# Patient Record
Sex: FEMALE | Race: WHITE
[De-identification: names, ages, dates, MRNs, and addresses within clinical notes are randomized per-mention and may not be internally consistent; named-entity substitution may affect disease eponyms.]

---

## 2017-02-13 ENCOUNTER — HOSPITAL ENCOUNTER (OUTPATIENT)
Dept: HOSPITAL 6 - PT | Age: 52
LOS: 73 days | Discharge: HOME | End: 2017-04-27
Payer: MEDICAID

## 2017-02-13 ENCOUNTER — HOSPITAL ENCOUNTER (EMERGENCY)
Dept: HOSPITAL 6 - ED | Age: 52
Discharge: HOME | End: 2017-02-13
Payer: MEDICAID

## 2017-02-13 DIAGNOSIS — R20.0: Primary | ICD-10-CM

## 2017-02-13 DIAGNOSIS — R53.1: ICD-10-CM

## 2017-02-13 DIAGNOSIS — Z86.73: ICD-10-CM

## 2017-02-13 DIAGNOSIS — M75.02: Primary | ICD-10-CM

## 2017-02-14 ENCOUNTER — HOSPITAL ENCOUNTER (OUTPATIENT)
Dept: HOSPITAL 19 - ZLAB.WCH | Age: 52
End: 2017-02-14

## 2017-02-14 DIAGNOSIS — Z01.89: Primary | ICD-10-CM

## 2017-04-14 ENCOUNTER — HOSPITAL ENCOUNTER (EMERGENCY)
Dept: HOSPITAL 6 - ED | Age: 52
LOS: 1 days | Discharge: HOME | End: 2017-04-15
Payer: MEDICAID
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
## 2017-04-14 DIAGNOSIS — S90.111A: Primary | ICD-10-CM

## 2017-04-15 VITALS — SYSTOLIC BLOOD PRESSURE: 112 MMHG | DIASTOLIC BLOOD PRESSURE: 74 MMHG

## 2017-04-30 ENCOUNTER — HOSPITAL ENCOUNTER (EMERGENCY)
Dept: HOSPITAL 19 - COL.ER | Age: 52
Discharge: HOME | End: 2017-04-30
Payer: MEDICAID

## 2017-04-30 VITALS — BODY MASS INDEX: 20.62 KG/M2 | WEIGHT: 128.31 LBS | HEIGHT: 65.98 IN

## 2017-04-30 VITALS — SYSTOLIC BLOOD PRESSURE: 108 MMHG | DIASTOLIC BLOOD PRESSURE: 63 MMHG | HEART RATE: 59 BPM

## 2017-04-30 VITALS — TEMPERATURE: 99.1 F

## 2017-04-30 DIAGNOSIS — N94.89: ICD-10-CM

## 2017-04-30 DIAGNOSIS — N93.9: ICD-10-CM

## 2017-04-30 DIAGNOSIS — R29.898: ICD-10-CM

## 2017-04-30 DIAGNOSIS — R10.12: Primary | ICD-10-CM

## 2017-04-30 LAB
ADJUSTED CALCIUM: 9.2 MG/DL (ref 8.4–10.2)
ALBUMIN SERPL-MCNC: 3.4 GM/DL (ref 3.5–5)
ALP SERPL-CCNC: 73 U/L (ref 50–136)
ALT SERPL-CCNC: 23 U/L (ref 9–52)
ANION GAP SERPL CALC-SCNC: 8 MMOL/L (ref 7–16)
BASOPHILS # BLD: 0.1 10*3/UL (ref 0–0.2)
BASOPHILS NFR BLD AUTO: 0.9 % (ref 0–2)
BILIRUB SERPL-MCNC: 0.5 MG/DL (ref 0–1)
BUN SERPL-MCNC: 9 MG/DL (ref 7–17)
CALCIUM SERPL-MCNC: 8.7 MG/DL (ref 8.4–10.2)
CHLAMYDIA/TRACH BY PCR FEMALE: NOT DETECTED
CHLORIDE SERPL-SCNC: 103 MMOL/L (ref 98–107)
CO2 SERPL-SCNC: 27 MMOL/L (ref 22–30)
CREAT SERPL-SCNC: 0.62 MG/DL (ref 0.52–1.25)
EOSINOPHIL # BLD: 0.4 10*3/UL (ref 0–0.7)
EOSINOPHIL NFR BLD: 3.8 % (ref 0–4)
ERYTHROCYTE [DISTWIDTH] IN BLOOD BY AUTOMATED COUNT: 12.1 % (ref 11.5–14.5)
GLUCOSE SERPL-MCNC: 83 MG/DL (ref 74–106)
GRANULOCYTES # BLD AUTO: 57.7 % (ref 42.2–75.2)
HCT VFR BLD AUTO: 34.5 % (ref 37–47)
HGB BLD-MCNC: 11.4 G/DL (ref 12.5–16)
LIPASE SERPL-CCNC: 41 U/L (ref 23–300)
LYMPHOCYTES # BLD: 3.7 10*3/UL (ref 1.2–3.4)
LYMPHOCYTES NFR BLD: 32 % (ref 20–51)
MCH RBC QN AUTO: 33 PG (ref 27–31)
MCHC RBC AUTO-ENTMCNC: 33 G/DL (ref 33–37)
MCV RBC AUTO: 99 FL (ref 80–100)
MONOCYTES # BLD: 0.6 10*3/UL (ref 0.1–0.6)
MONOCYTES NFR BLD AUTO: 5.3 % (ref 1.7–9.3)
NEISSERIA GON BY PCR FEMALE: NOT DETECTED
NEUTROPHILS # BLD: 6.7 10*3/UL (ref 1.4–6.5)
PH UR STRIP.AUTO: 6 [PH] (ref 5–8)
PLATELET # BLD AUTO: 212 K/MM3 (ref 130–400)
PMV BLD AUTO: 10.2 FL (ref 7.4–10.4)
POTASSIUM SERPL-SCNC: 3.8 MMOL/L (ref 3.4–5)
PROT SERPL-MCNC: 6 GM/DL (ref 6.4–8.2)
RBC # BLD AUTO: 3.48 M/MM3 (ref 4.1–5.3)
RBC # UR: (no result) /HPF
SODIUM SERPL-SCNC: 137 MMOL/L (ref 137–145)
SP GR UR STRIP.AUTO: 1 (ref 1–1.03)
SQUAMOUS # URNS: (no result) /HPF
WBC # BLD AUTO: 11.6 K/MM3 (ref 4.8–10.8)
WBC #/AREA URNS HPF: (no result) /HPF

## 2017-05-16 ENCOUNTER — HOSPITAL ENCOUNTER (EMERGENCY)
Dept: HOSPITAL 19 - COL.ER | Age: 52
Discharge: HOME | End: 2017-05-16
Payer: MEDICAID

## 2017-05-16 VITALS — TEMPERATURE: 98.9 F

## 2017-05-16 VITALS — HEIGHT: 65.98 IN | WEIGHT: 131.18 LBS | BODY MASS INDEX: 21.08 KG/M2

## 2017-05-16 VITALS — SYSTOLIC BLOOD PRESSURE: 110 MMHG | DIASTOLIC BLOOD PRESSURE: 73 MMHG | HEART RATE: 56 BPM

## 2017-05-16 DIAGNOSIS — R10.31: Primary | ICD-10-CM

## 2017-05-16 DIAGNOSIS — J44.9: ICD-10-CM

## 2017-05-16 DIAGNOSIS — I35.1: ICD-10-CM

## 2017-05-16 DIAGNOSIS — Z99.81: ICD-10-CM

## 2017-05-16 DIAGNOSIS — F17.210: ICD-10-CM

## 2017-05-16 DIAGNOSIS — F43.10: ICD-10-CM

## 2017-05-16 LAB
ADJUSTED CALCIUM: 9.1 MG/DL (ref 8.4–10.2)
ALBUMIN SERPL-MCNC: 4 GM/DL (ref 3.5–5)
ALP SERPL-CCNC: 70 U/L (ref 50–136)
ALT SERPL-CCNC: 18 U/L (ref 9–52)
ANION GAP SERPL CALC-SCNC: 10 MMOL/L (ref 7–16)
BASOPHILS # BLD: 0.1 10*3/UL (ref 0–0.2)
BASOPHILS NFR BLD AUTO: 1.1 % (ref 0–2)
BILIRUB SERPL-MCNC: 0.6 MG/DL (ref 0–1)
BUN SERPL-MCNC: 11 MG/DL (ref 7–17)
CALCIUM SERPL-MCNC: 9.1 MG/DL (ref 8.4–10.2)
CHLAMYDIA/TRACH BY PCR FEMALE: NOT DETECTED
CHLORIDE SERPL-SCNC: 101 MMOL/L (ref 98–107)
CO2 SERPL-SCNC: 27 MMOL/L (ref 22–30)
CREAT SERPL-SCNC: 0.67 MG/DL (ref 0.52–1.25)
EOSINOPHIL # BLD: 0.4 10*3/UL (ref 0–0.7)
EOSINOPHIL NFR BLD: 4.2 % (ref 0–4)
ERYTHROCYTE [DISTWIDTH] IN BLOOD BY AUTOMATED COUNT: 11.8 % (ref 11.5–14.5)
GLUCOSE SERPL-MCNC: 76 MG/DL (ref 74–106)
GRANULOCYTES # BLD AUTO: 51.9 % (ref 42.2–75.2)
HCT VFR BLD AUTO: 38.2 % (ref 37–47)
HGB BLD-MCNC: 12.8 G/DL (ref 12.5–16)
LYMPHOCYTES # BLD: 3.3 10*3/UL (ref 1.2–3.4)
LYMPHOCYTES NFR BLD: 35.4 % (ref 20–51)
MCH RBC QN AUTO: 33 PG (ref 27–31)
MCHC RBC AUTO-ENTMCNC: 34 G/DL (ref 33–37)
MCV RBC AUTO: 98 FL (ref 80–100)
MONOCYTES # BLD: 0.7 10*3/UL (ref 0.1–0.6)
MONOCYTES NFR BLD AUTO: 7.1 % (ref 1.7–9.3)
NEISSERIA GON BY PCR FEMALE: NOT DETECTED
NEUTROPHILS # BLD: 4.8 10*3/UL (ref 1.4–6.5)
PH UR STRIP.AUTO: 6 [PH] (ref 5–8)
PLATELET # BLD AUTO: 181 K/MM3 (ref 130–400)
PMV BLD AUTO: 10.7 FL (ref 7.4–10.4)
POTASSIUM SERPL-SCNC: 4.2 MMOL/L (ref 3.4–5)
PROT SERPL-MCNC: 6.7 GM/DL (ref 6.4–8.2)
RBC # BLD AUTO: 3.91 M/MM3 (ref 4.1–5.3)
RBC # UR: (no result) /HPF
SODIUM SERPL-SCNC: 139 MMOL/L (ref 137–145)
SP GR UR STRIP.AUTO: 1.02 (ref 1–1.03)
SQUAMOUS # URNS: (no result) /HPF
WBC # BLD AUTO: 9.2 K/MM3 (ref 4.8–10.8)
WBC # UR: (no result) /HPF

## 2017-05-17 ENCOUNTER — HOSPITAL ENCOUNTER (EMERGENCY)
Dept: HOSPITAL 19 - COL.ER | Age: 52
Discharge: HOME | End: 2017-05-17
Payer: MEDICAID

## 2017-05-17 VITALS — SYSTOLIC BLOOD PRESSURE: 110 MMHG | HEART RATE: 51 BPM | DIASTOLIC BLOOD PRESSURE: 82 MMHG

## 2017-05-17 VITALS — BODY MASS INDEX: 20.44 KG/M2 | TEMPERATURE: 98.8 F | HEIGHT: 66 IN | WEIGHT: 127.21 LBS

## 2017-05-17 DIAGNOSIS — F17.210: ICD-10-CM

## 2017-05-17 DIAGNOSIS — I95.9: ICD-10-CM

## 2017-05-17 DIAGNOSIS — F43.10: ICD-10-CM

## 2017-05-17 DIAGNOSIS — J44.9: ICD-10-CM

## 2017-05-17 DIAGNOSIS — R11.2: ICD-10-CM

## 2017-05-17 DIAGNOSIS — R10.32: ICD-10-CM

## 2017-05-17 DIAGNOSIS — R10.31: Primary | ICD-10-CM

## 2017-05-17 LAB
ADJUSTED CALCIUM: 9.3 MG/DL (ref 8.4–10.2)
ALBUMIN SERPL-MCNC: 4.1 GM/DL (ref 3.5–5)
ALP SERPL-CCNC: 76 U/L (ref 50–136)
ALT SERPL-CCNC: 23 U/L (ref 9–52)
ANION GAP SERPL CALC-SCNC: 8 MMOL/L (ref 7–16)
BASOPHILS # BLD: 0.1 10*3/UL (ref 0–0.2)
BASOPHILS NFR BLD AUTO: 1.2 % (ref 0–2)
BILIRUB SERPL-MCNC: 0.6 MG/DL (ref 0–1)
BUN SERPL-MCNC: 11 MG/DL (ref 7–17)
CALCIUM SERPL-MCNC: 9.4 MG/DL (ref 8.4–10.2)
CHLORIDE SERPL-SCNC: 99 MMOL/L (ref 98–107)
CO2 SERPL-SCNC: 30 MMOL/L (ref 22–30)
CREAT SERPL-SCNC: 0.67 MG/DL (ref 0.52–1.25)
CRP SERPL-MCNC: < 0.5 MG/DL (ref 0–0.9)
EOSINOPHIL # BLD: 0.4 10*3/UL (ref 0–0.7)
EOSINOPHIL NFR BLD: 3.4 % (ref 0–4)
ERYTHROCYTE [DISTWIDTH] IN BLOOD BY AUTOMATED COUNT: 11.5 % (ref 11.5–14.5)
GLUCOSE SERPL-MCNC: 82 MG/DL (ref 74–106)
GRANULOCYTES # BLD AUTO: 57.4 % (ref 42.2–75.2)
HCT VFR BLD AUTO: 38.7 % (ref 37–47)
HGB BLD-MCNC: 12.9 G/DL (ref 12.5–16)
LYMPHOCYTES # BLD: 3.2 10*3/UL (ref 1.2–3.4)
LYMPHOCYTES NFR BLD: 31.3 % (ref 20–51)
MCH RBC QN AUTO: 33 PG (ref 27–31)
MCHC RBC AUTO-ENTMCNC: 33 G/DL (ref 33–37)
MCV RBC AUTO: 98 FL (ref 80–100)
MONOCYTES # BLD: 0.7 10*3/UL (ref 0.1–0.6)
MONOCYTES NFR BLD AUTO: 6.4 % (ref 1.7–9.3)
NEUTROPHILS # BLD: 6 10*3/UL (ref 1.4–6.5)
PLATELET # BLD AUTO: 185 K/MM3 (ref 130–400)
PMV BLD AUTO: 11.3 FL (ref 7.4–10.4)
POTASSIUM SERPL-SCNC: 4.1 MMOL/L (ref 3.4–5)
PROT SERPL-MCNC: 6.7 GM/DL (ref 6.4–8.2)
RBC # BLD AUTO: 3.94 M/MM3 (ref 4.1–5.3)
SODIUM SERPL-SCNC: 138 MMOL/L (ref 137–145)
WBC # BLD AUTO: 10.4 K/MM3 (ref 4.8–10.8)

## 2017-06-14 ENCOUNTER — HOSPITAL ENCOUNTER (EMERGENCY)
Dept: HOSPITAL 19 - COL.ER | Age: 52
Discharge: HOME | End: 2017-06-14
Payer: MEDICAID

## 2017-06-14 VITALS — WEIGHT: 128.31 LBS | HEIGHT: 65.98 IN | BODY MASS INDEX: 20.62 KG/M2

## 2017-06-14 VITALS — SYSTOLIC BLOOD PRESSURE: 105 MMHG | DIASTOLIC BLOOD PRESSURE: 65 MMHG | HEART RATE: 63 BPM

## 2017-06-14 VITALS — TEMPERATURE: 98.7 F

## 2017-06-14 DIAGNOSIS — I10: ICD-10-CM

## 2017-06-14 DIAGNOSIS — G40.909: ICD-10-CM

## 2017-06-14 DIAGNOSIS — R55: ICD-10-CM

## 2017-06-14 DIAGNOSIS — R51: Primary | ICD-10-CM

## 2017-06-14 DIAGNOSIS — R07.9: ICD-10-CM

## 2017-06-14 DIAGNOSIS — F17.210: ICD-10-CM

## 2017-06-14 LAB
ADJUSTED CALCIUM: 9 MG/DL (ref 8.4–10.2)
ALBUMIN SERPL-MCNC: 4.4 GM/DL (ref 3.5–5)
ALP SERPL-CCNC: 71 U/L (ref 50–136)
ALT SERPL-CCNC: 15 U/L (ref 9–52)
ANION GAP SERPL CALC-SCNC: 11 MMOL/L (ref 7–16)
BASOPHILS # BLD: 0.1 10*3/UL (ref 0–0.2)
BASOPHILS NFR BLD AUTO: 1.1 % (ref 0–2)
BILIRUB SERPL-MCNC: 0.7 MG/DL (ref 0–1)
BUN SERPL-MCNC: 6 MG/DL (ref 7–17)
CALCIUM SERPL-MCNC: 9.3 MG/DL (ref 8.4–10.2)
CHLORIDE SERPL-SCNC: 100 MMOL/L (ref 98–107)
CO2 SERPL-SCNC: 27 MMOL/L (ref 22–30)
CREAT SERPL-SCNC: 0.61 MG/DL (ref 0.52–1.25)
CRP SERPL-MCNC: 0.5 MG/DL (ref 0–0.9)
DIGOXIN SERPL-MCNC: 20.9 NG/ML (ref 3–18.6)
EOSINOPHIL # BLD: 0.5 10*3/UL (ref 0–0.7)
EOSINOPHIL NFR BLD: 4.7 % (ref 0–4)
ERYTHROCYTE [DISTWIDTH] IN BLOOD BY AUTOMATED COUNT: 11.6 % (ref 11.5–14.5)
ERYTHROCYTE [SEDIMENTATION RATE] IN BLOOD: 7 MM/HR (ref 0–20)
GLUCOSE SERPL-MCNC: 77 MG/DL (ref 74–106)
GRANULOCYTES # BLD AUTO: 50.7 % (ref 42.2–75.2)
HCT VFR BLD AUTO: 39.4 % (ref 37–47)
HGB BLD-MCNC: 13.4 G/DL (ref 12.5–16)
LYMPHOCYTES # BLD: 3.7 10*3/UL (ref 1.2–3.4)
LYMPHOCYTES NFR BLD: 35.9 % (ref 20–51)
MCH RBC QN AUTO: 32 PG (ref 27–31)
MCHC RBC AUTO-ENTMCNC: 34 G/DL (ref 33–37)
MCV RBC AUTO: 95 FL (ref 80–100)
MONOCYTES # BLD: 0.7 10*3/UL (ref 0.1–0.6)
MONOCYTES NFR BLD AUTO: 7.2 % (ref 1.7–9.3)
NEUTROPHILS # BLD: 5.2 10*3/UL (ref 1.4–6.5)
PLATELET # BLD AUTO: 221 K/MM3 (ref 130–400)
PMV BLD AUTO: 9.8 FL (ref 7.4–10.4)
POTASSIUM SERPL-SCNC: 3.9 MMOL/L (ref 3.4–5)
PROT SERPL-MCNC: 7.1 GM/DL (ref 6.4–8.2)
RBC # BLD AUTO: 4.13 M/MM3 (ref 4.1–5.3)
SODIUM SERPL-SCNC: 138 MMOL/L (ref 137–145)
TROPONIN I SERPL-MCNC: < 0.012 NG/ML (ref 0–0.03)
WBC # BLD AUTO: 10.3 K/MM3 (ref 4.8–10.8)

## 2017-07-27 ENCOUNTER — HOSPITAL ENCOUNTER (EMERGENCY)
Dept: HOSPITAL 19 - COL.ER | Age: 52
Discharge: HOME | End: 2017-07-27
Payer: MEDICAID

## 2017-07-27 VITALS — SYSTOLIC BLOOD PRESSURE: 101 MMHG | TEMPERATURE: 99.1 F | DIASTOLIC BLOOD PRESSURE: 55 MMHG

## 2017-07-27 VITALS — HEIGHT: 65.98 IN | WEIGHT: 128.31 LBS | BODY MASS INDEX: 20.62 KG/M2

## 2017-07-27 VITALS — HEART RATE: 82 BPM
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
## 2017-07-27 DIAGNOSIS — S06.0X9A: Primary | ICD-10-CM

## 2017-07-27 DIAGNOSIS — R56.9: ICD-10-CM

## 2017-07-27 DIAGNOSIS — M54.2: ICD-10-CM
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
## 2017-07-27 DIAGNOSIS — S00.93XA: ICD-10-CM

## 2017-07-27 DIAGNOSIS — Z91.81: ICD-10-CM

## 2017-07-27 DIAGNOSIS — M54.89: ICD-10-CM

## 2018-01-10 ENCOUNTER — HOSPITAL ENCOUNTER (OUTPATIENT)
Dept: HOSPITAL 19 - COL.RAD | Age: 53
End: 2018-01-10
Attending: PSYCHIATRY & NEUROLOGY
Payer: MEDICAID

## 2018-01-10 DIAGNOSIS — R42: Primary | ICD-10-CM

## 2018-01-10 DIAGNOSIS — R51: ICD-10-CM

## 2018-01-10 DIAGNOSIS — R26.81: ICD-10-CM

## 2018-04-21 ENCOUNTER — HOSPITAL ENCOUNTER (EMERGENCY)
Dept: HOSPITAL 19 - COL.ER | Age: 53
Discharge: HOME | End: 2018-04-21
Payer: MEDICAID

## 2018-04-21 VITALS — HEIGHT: 72 IN | BODY MASS INDEX: 21.86 KG/M2 | WEIGHT: 161.38 LBS

## 2018-04-21 VITALS — SYSTOLIC BLOOD PRESSURE: 112 MMHG | TEMPERATURE: 99 F | DIASTOLIC BLOOD PRESSURE: 63 MMHG

## 2018-04-21 VITALS — HEART RATE: 79 BPM

## 2018-04-21 DIAGNOSIS — R19.7: Primary | ICD-10-CM

## 2018-04-21 DIAGNOSIS — R10.9: ICD-10-CM

## 2018-04-21 DIAGNOSIS — Z90.49: ICD-10-CM

## 2018-04-21 DIAGNOSIS — G43.909: ICD-10-CM

## 2018-04-21 LAB
ALBUMIN SERPL-MCNC: 3.6 GM/DL (ref 3.5–5)
ALP SERPL-CCNC: 113 U/L (ref 50–136)
ALT SERPL-CCNC: 18 U/L (ref 9–52)
ANION GAP SERPL CALC-SCNC: 14 MMOL/L (ref 7–16)
AST SERPL-CCNC: 15 U/L (ref 15–37)
BASOPHILS # BLD: 0.1 10*3/UL (ref 0–0.2)
BASOPHILS NFR BLD AUTO: 0.4 % (ref 0–2)
BILIRUB SERPL-MCNC: 0.3 MG/DL (ref 0–1)
BUN SERPL-MCNC: 11 MG/DL (ref 7–17)
CALCIUM SERPL-MCNC: 8.6 MG/DL (ref 8.4–10.2)
CHLORIDE SERPL-SCNC: 102 MMOL/L (ref 98–107)
CO2 SERPL-SCNC: 20 MMOL/L (ref 22–30)
CREAT SERPL-SCNC: 0.85 MG/DL (ref 0.52–1.25)
CRP SERPL-MCNC: 3.4 MG/DL (ref 0–0.9)
EOSINOPHIL # BLD: 0.4 10*3/UL (ref 0–0.7)
EOSINOPHIL NFR BLD: 3.4 % (ref 0–4)
ERYTHROCYTE [DISTWIDTH] IN BLOOD BY AUTOMATED COUNT: 12.7 % (ref 11.5–14.5)
GLUCOSE SERPL-MCNC: 95 MG/DL (ref 74–106)
GRANULOCYTES # BLD AUTO: 72 % (ref 42.2–75.2)
HCT VFR BLD AUTO: 34.9 % (ref 37–47)
HGB BLD-MCNC: 11.8 G/DL (ref 12.5–16)
LYMPHOCYTES # BLD: 2.2 10*3/UL (ref 1.2–3.4)
LYMPHOCYTES NFR BLD: 17.8 % (ref 20–51)
MCH RBC QN AUTO: 31 PG (ref 27–31)
MCHC RBC AUTO-ENTMCNC: 34 G/DL (ref 33–37)
MCV RBC AUTO: 90 FL (ref 80–100)
MONOCYTES # BLD: 0.6 10*3/UL (ref 0.1–0.6)
MONOCYTES NFR BLD AUTO: 5.2 % (ref 1.7–9.3)
NEUTROPHILS # BLD: 8.8 10*3/UL (ref 1.4–6.5)
PH UR STRIP.AUTO: 5 [PH] (ref 5–8)
PLATELET # BLD AUTO: 279 K/MM3 (ref 130–400)
PMV BLD AUTO: 9.9 FL (ref 7.4–10.4)
POTASSIUM SERPL-SCNC: 3.5 MMOL/L (ref 3.4–5)
PROT SERPL-MCNC: 7 GM/DL (ref 6.4–8.2)
RBC # BLD AUTO: 3.86 M/MM3 (ref 4.1–5.3)
RBC # UR: (no result) /HPF
SODIUM SERPL-SCNC: 135 MMOL/L (ref 137–145)
SP GR UR STRIP.AUTO: 1.02 (ref 1–1.03)
SQUAMOUS # URNS: (no result) /HPF
URN COLLECT METHOD CLASS: (no result)

## 2018-04-26 ENCOUNTER — HOSPITAL ENCOUNTER (OUTPATIENT)
Dept: HOSPITAL 19 - COL.RAD | Age: 53
End: 2018-04-26
Payer: MEDICAID

## 2018-04-26 DIAGNOSIS — R14.0: ICD-10-CM

## 2018-04-26 DIAGNOSIS — Z88.0: ICD-10-CM

## 2018-04-26 DIAGNOSIS — K59.00: Primary | ICD-10-CM

## 2018-04-26 DIAGNOSIS — R63.5: ICD-10-CM

## 2018-04-26 DIAGNOSIS — Z88.1: ICD-10-CM

## 2018-04-26 DIAGNOSIS — R14.2: ICD-10-CM

## 2018-04-26 DIAGNOSIS — R10.9: ICD-10-CM

## 2018-04-26 DIAGNOSIS — R19.7: ICD-10-CM

## 2018-04-26 DIAGNOSIS — R63.0: ICD-10-CM

## 2018-06-08 ENCOUNTER — HOSPITAL ENCOUNTER (EMERGENCY)
Dept: HOSPITAL 6 - ED | Age: 53
LOS: 1 days | Discharge: HOME | End: 2018-06-09
Payer: MEDICAID

## 2018-06-08 VITALS — HEIGHT: 63 IN | BODY MASS INDEX: 31.95 KG/M2 | WEIGHT: 180.34 LBS

## 2018-06-08 DIAGNOSIS — F17.200: ICD-10-CM

## 2018-06-08 DIAGNOSIS — T50.905A: ICD-10-CM

## 2018-06-08 DIAGNOSIS — R22.0: Primary | ICD-10-CM

## 2018-06-08 DIAGNOSIS — J44.9: ICD-10-CM

## 2018-06-08 DIAGNOSIS — B37.0: ICD-10-CM

## 2018-06-09 VITALS
DIASTOLIC BLOOD PRESSURE: 74 MMHG | SYSTOLIC BLOOD PRESSURE: 105 MMHG | SYSTOLIC BLOOD PRESSURE: 105 MMHG | SYSTOLIC BLOOD PRESSURE: 105 MMHG | DIASTOLIC BLOOD PRESSURE: 74 MMHG | DIASTOLIC BLOOD PRESSURE: 74 MMHG | SYSTOLIC BLOOD PRESSURE: 105 MMHG | DIASTOLIC BLOOD PRESSURE: 74 MMHG

## 2018-07-06 ENCOUNTER — HOSPITAL ENCOUNTER (INPATIENT)
Dept: HOSPITAL 19 - COL.ER | Age: 53
LOS: 3 days | Discharge: HOME | DRG: 313 | End: 2018-07-09
Attending: HEALTH CARE PROVIDER | Admitting: HEALTH CARE PROVIDER
Payer: MEDICAID

## 2018-07-06 VITALS — BODY MASS INDEX: 28.8 KG/M2 | WEIGHT: 172.84 LBS | HEIGHT: 65 IN

## 2018-07-06 DIAGNOSIS — R07.89: Primary | ICD-10-CM

## 2018-07-06 DIAGNOSIS — M79.7: ICD-10-CM

## 2018-07-06 DIAGNOSIS — G62.9: ICD-10-CM

## 2018-07-06 DIAGNOSIS — G40.909: ICD-10-CM

## 2018-07-06 DIAGNOSIS — Z87.891: ICD-10-CM

## 2018-07-06 DIAGNOSIS — I08.0: ICD-10-CM

## 2018-07-06 DIAGNOSIS — J44.9: ICD-10-CM

## 2018-07-06 DIAGNOSIS — I50.22: ICD-10-CM

## 2018-07-06 DIAGNOSIS — I25.10: ICD-10-CM

## 2018-07-06 LAB
ALBUMIN SERPL-MCNC: 3.8 GM/DL (ref 3.5–5)
ALP SERPL-CCNC: 139 U/L (ref 50–136)
ALT SERPL-CCNC: 63 U/L (ref 9–52)
ANION GAP SERPL CALC-SCNC: 11 MMOL/L (ref 7–16)
AST SERPL-CCNC: 28 U/L (ref 15–37)
BASOPHILS # BLD: 0.1 10*3/UL (ref 0–0.2)
BASOPHILS NFR BLD AUTO: 0.7 % (ref 0–2)
BILIRUB SERPL-MCNC: 0.1 MG/DL (ref 0–1)
BUN SERPL-MCNC: 12 MG/DL (ref 7–17)
CALCIUM SERPL-MCNC: 9 MG/DL (ref 8.4–10.2)
CHLORIDE SERPL-SCNC: 100 MMOL/L (ref 98–107)
CK SERPL-CCNC: 29 U/L (ref 30–135)
CO2 SERPL-SCNC: 26 MMOL/L (ref 22–30)
CREAT SERPL-SCNC: 0.64 MG/DL (ref 0.52–1.25)
DEPRECATED D DIMER PPP IA-ACNC: < 200 NG/MLDDU (ref 200–230)
EOSINOPHIL # BLD: 0.2 10*3/UL (ref 0–0.7)
EOSINOPHIL NFR BLD: 1.7 % (ref 0–4)
ERYTHROCYTE [DISTWIDTH] IN BLOOD BY AUTOMATED COUNT: 13.5 % (ref 11.5–14.5)
GLUCOSE SERPL-MCNC: 98 MG/DL (ref 74–106)
GRANULOCYTES # BLD AUTO: 63.7 % (ref 42.2–75.2)
HCT VFR BLD AUTO: 35.3 % (ref 37–47)
HGB BLD-MCNC: 11.2 G/DL (ref 12.5–16)
INR BLD: 0.9 (ref 0.8–3)
LIPASE SERPL-CCNC: 52 U/L (ref 23–300)
LYMPHOCYTES # BLD: 3.2 10*3/UL (ref 1.2–3.4)
LYMPHOCYTES NFR BLD: 23.7 % (ref 20–51)
MCH RBC QN AUTO: 30 PG (ref 27–31)
MCHC RBC AUTO-ENTMCNC: 32 G/DL (ref 33–37)
MCV RBC AUTO: 95 FL (ref 80–100)
MONOCYTES # BLD: 1 10*3/UL (ref 0.1–0.6)
MONOCYTES NFR BLD AUTO: 7.6 % (ref 1.7–9.3)
NEUTROPHILS # BLD: 8.5 10*3/UL (ref 1.4–6.5)
PLATELET # BLD AUTO: 320 K/MM3 (ref 130–400)
PMV BLD AUTO: 9.8 FL (ref 7.4–10.4)
POTASSIUM SERPL-SCNC: 4 MMOL/L (ref 3.4–5)
PROT SERPL-MCNC: 6.7 GM/DL (ref 6.4–8.2)
PROTHROMBIN TIME: 9.7 SECONDS (ref 9.7–12.8)
RBC # BLD AUTO: 3.7 M/MM3 (ref 4.1–5.3)
SODIUM SERPL-SCNC: 137 MMOL/L (ref 137–145)
TROPONIN I SERPL-MCNC: < 0.012 NG/ML (ref 0–0.03)

## 2018-07-06 PROCEDURE — A9502 TC99M TETROFOSMIN: HCPCS

## 2018-07-07 VITALS — OXYGEN SATURATION: 98 %

## 2018-07-07 VITALS — DIASTOLIC BLOOD PRESSURE: 72 MMHG | HEART RATE: 79 BPM | SYSTOLIC BLOOD PRESSURE: 118 MMHG | TEMPERATURE: 97.8 F

## 2018-07-07 VITALS — OXYGEN SATURATION: 97 %

## 2018-07-07 VITALS — OXYGEN SATURATION: 96 %

## 2018-07-07 VITALS — OXYGEN SATURATION: 99 %

## 2018-07-07 VITALS — OXYGEN SATURATION: 95 %

## 2018-07-07 VITALS — TEMPERATURE: 97.1 F | DIASTOLIC BLOOD PRESSURE: 69 MMHG | SYSTOLIC BLOOD PRESSURE: 96 MMHG | HEART RATE: 85 BPM

## 2018-07-07 VITALS — OXYGEN SATURATION: 90 %

## 2018-07-07 VITALS
DIASTOLIC BLOOD PRESSURE: 72 MMHG | SYSTOLIC BLOOD PRESSURE: 112 MMHG | OXYGEN SATURATION: 98 % | TEMPERATURE: 97.1 F | HEART RATE: 84 BPM

## 2018-07-07 VITALS — DIASTOLIC BLOOD PRESSURE: 67 MMHG | SYSTOLIC BLOOD PRESSURE: 93 MMHG | TEMPERATURE: 97 F | HEART RATE: 80 BPM

## 2018-07-07 VITALS — OXYGEN SATURATION: 93 %

## 2018-07-07 VITALS — OXYGEN SATURATION: 100 %

## 2018-07-07 VITALS — HEART RATE: 89 BPM | SYSTOLIC BLOOD PRESSURE: 107 MMHG | DIASTOLIC BLOOD PRESSURE: 69 MMHG | TEMPERATURE: 98.3 F

## 2018-07-07 VITALS — OXYGEN SATURATION: 92 %

## 2018-07-07 VITALS — DIASTOLIC BLOOD PRESSURE: 71 MMHG | HEART RATE: 82 BPM | SYSTOLIC BLOOD PRESSURE: 111 MMHG

## 2018-07-07 VITALS — OXYGEN SATURATION: 94 %

## 2018-07-07 LAB
ALBUMIN SERPL-MCNC: 3.1 GM/DL (ref 3.5–5)
ALP SERPL-CCNC: 105 U/L (ref 50–136)
ALT SERPL-CCNC: 55 U/L (ref 9–52)
ANION GAP SERPL CALC-SCNC: 7 MMOL/L (ref 7–16)
AST SERPL-CCNC: 26 U/L (ref 15–37)
BILIRUB SERPL-MCNC: 0.2 MG/DL (ref 0–1)
BUN SERPL-MCNC: 11 MG/DL (ref 7–17)
CALCIUM SERPL-MCNC: 8.3 MG/DL (ref 8.4–10.2)
CHLORIDE SERPL-SCNC: 101 MMOL/L (ref 98–107)
CHOLEST SPEC-SCNC: 226 MG/DL (ref 120–200)
CHOLEST/HDLC SERPL-SRTO: 5.3
CO2 SERPL-SCNC: 29 MMOL/L (ref 22–30)
CREAT SERPL-SCNC: 0.65 MG/DL (ref 0.52–1.25)
EOSINOPHIL NFR BLD: 2 % (ref 0–4)
ERYTHROCYTE [DISTWIDTH] IN BLOOD BY AUTOMATED COUNT: 13.5 % (ref 11.5–14.5)
GLUCOSE SERPL-MCNC: 93 MG/DL (ref 74–106)
HCT VFR BLD AUTO: 31 % (ref 37–47)
HDLC SERPL-MCNC: 42 MG/DL
HGB BLD-MCNC: 9.7 G/DL (ref 12.5–16)
LDLC SERPL-MCNC: 140 MG/DL
LYMPHOCYTES NFR BLD MANUAL: 34 % (ref 20–51)
MCH RBC QN AUTO: 30 PG (ref 27–31)
MCHC RBC AUTO-ENTMCNC: 31 G/DL (ref 33–37)
MCV RBC AUTO: 95 FL (ref 80–100)
MONOCYTES NFR BLD: 4 % (ref 1.7–9.3)
NEUTS BAND NFR BLD: 5 % (ref 0–10)
NEUTS SEG NFR BLD MANUAL: 55 % (ref 42–75.2)
PH UR STRIP.AUTO: 6 [PH] (ref 5–8)
PLATELET # BLD AUTO: 267 K/MM3 (ref 130–400)
PLATELET BLD QL SMEAR: NORMAL
PMV BLD AUTO: 9.6 FL (ref 7.4–10.4)
POTASSIUM SERPL-SCNC: 4.2 MMOL/L (ref 3.4–5)
PROT SERPL-MCNC: 5.7 GM/DL (ref 6.4–8.2)
RBC # BLD AUTO: 3.25 M/MM3 (ref 4.1–5.3)
RBC # UR STRIP.AUTO: (no result) /UL
RBC # UR: (no result) /HPF
SODIUM SERPL-SCNC: 137 MMOL/L (ref 137–145)
SP GR UR STRIP.AUTO: 1.01 (ref 1–1.03)
SQUAMOUS # URNS: (no result) /HPF
TRIGL SERPL-MCNC: 222 MG/DL
TROPONIN I SERPL-MCNC: < 0.012 NG/ML (ref 0–0.03)
URN COLLECT METHOD CLASS: (no result)

## 2018-07-08 VITALS — HEART RATE: 85 BPM | DIASTOLIC BLOOD PRESSURE: 69 MMHG | TEMPERATURE: 97.4 F | SYSTOLIC BLOOD PRESSURE: 121 MMHG

## 2018-07-08 VITALS — DIASTOLIC BLOOD PRESSURE: 73 MMHG | SYSTOLIC BLOOD PRESSURE: 114 MMHG | TEMPERATURE: 98.4 F | HEART RATE: 77 BPM

## 2018-07-08 VITALS — SYSTOLIC BLOOD PRESSURE: 113 MMHG | DIASTOLIC BLOOD PRESSURE: 69 MMHG | HEART RATE: 80 BPM | TEMPERATURE: 98 F

## 2018-07-08 VITALS — DIASTOLIC BLOOD PRESSURE: 57 MMHG | SYSTOLIC BLOOD PRESSURE: 99 MMHG | TEMPERATURE: 98.1 F | HEART RATE: 77 BPM

## 2018-07-08 VITALS — SYSTOLIC BLOOD PRESSURE: 104 MMHG | TEMPERATURE: 98.5 F | HEART RATE: 79 BPM | DIASTOLIC BLOOD PRESSURE: 67 MMHG

## 2018-07-08 VITALS — HEART RATE: 75 BPM | DIASTOLIC BLOOD PRESSURE: 67 MMHG | SYSTOLIC BLOOD PRESSURE: 101 MMHG | TEMPERATURE: 98.2 F

## 2018-07-09 VITALS — HEART RATE: 94 BPM | DIASTOLIC BLOOD PRESSURE: 57 MMHG | SYSTOLIC BLOOD PRESSURE: 94 MMHG

## 2018-07-09 VITALS — TEMPERATURE: 98.6 F | SYSTOLIC BLOOD PRESSURE: 105 MMHG | HEART RATE: 106 BPM | DIASTOLIC BLOOD PRESSURE: 68 MMHG

## 2018-07-09 VITALS — HEART RATE: 109 BPM | SYSTOLIC BLOOD PRESSURE: 115 MMHG | DIASTOLIC BLOOD PRESSURE: 62 MMHG

## 2018-07-09 VITALS — TEMPERATURE: 98.4 F | SYSTOLIC BLOOD PRESSURE: 94 MMHG | HEART RATE: 94 BPM | DIASTOLIC BLOOD PRESSURE: 57 MMHG

## 2018-07-09 VITALS — DIASTOLIC BLOOD PRESSURE: 68 MMHG | HEART RATE: 92 BPM | SYSTOLIC BLOOD PRESSURE: 111 MMHG | TEMPERATURE: 98.4 F

## 2018-07-09 VITALS — DIASTOLIC BLOOD PRESSURE: 66 MMHG | SYSTOLIC BLOOD PRESSURE: 113 MMHG | HEART RATE: 83 BPM

## 2018-07-09 VITALS — SYSTOLIC BLOOD PRESSURE: 96 MMHG | DIASTOLIC BLOOD PRESSURE: 51 MMHG | HEART RATE: 87 BPM | TEMPERATURE: 98.5 F

## 2018-07-09 VITALS — HEART RATE: 113 BPM | SYSTOLIC BLOOD PRESSURE: 118 MMHG | DIASTOLIC BLOOD PRESSURE: 68 MMHG

## 2018-07-09 VITALS — HEART RATE: 111 BPM | SYSTOLIC BLOOD PRESSURE: 116 MMHG | DIASTOLIC BLOOD PRESSURE: 66 MMHG

## 2018-07-09 LAB
ANION GAP SERPL CALC-SCNC: 12 MMOL/L (ref 7–16)
BASOPHILS NFR BLD MANUAL: 2 % (ref 0–2)
BUN SERPL-MCNC: 17 MG/DL (ref 7–17)
CALCIUM SERPL-MCNC: 9.4 MG/DL (ref 8.4–10.2)
CHLORIDE SERPL-SCNC: 99 MMOL/L (ref 98–107)
CO2 SERPL-SCNC: 27 MMOL/L (ref 22–30)
CREAT SERPL-SCNC: 0.78 MG/DL (ref 0.52–1.25)
EOSINOPHIL NFR BLD: 4 % (ref 0–4)
ERYTHROCYTE [DISTWIDTH] IN BLOOD BY AUTOMATED COUNT: 13.4 % (ref 11.5–14.5)
FERRITIN SERPL-MCNC: 70 NG/ML (ref 11–264)
GLUCOSE SERPL-MCNC: 102 MG/DL (ref 74–106)
HCT VFR BLD AUTO: 36.1 % (ref 37–47)
HGB BLD-MCNC: 11.5 G/DL (ref 12.5–16)
IRON SERPL-MCNC: 66 UG/DL (ref 35–150)
LYMPHOCYTES NFR BLD MANUAL: 21 % (ref 20–51)
MAGNESIUM SERPL-MCNC: 2.2 MG/DL (ref 1.6–2.3)
MCH RBC QN AUTO: 30 PG (ref 27–31)
MCHC RBC AUTO-ENTMCNC: 32 G/DL (ref 33–37)
MCV RBC AUTO: 95 FL (ref 80–100)
MONOCYTES NFR BLD: 2 % (ref 1.7–9.3)
NEUTS BAND NFR BLD: 1 % (ref 0–10)
NEUTS SEG NFR BLD MANUAL: 70 % (ref 42–75.2)
PLATELET # BLD AUTO: 303 K/MM3 (ref 130–400)
PLATELET BLD QL SMEAR: NORMAL
PMV BLD AUTO: 9.5 FL (ref 7.4–10.4)
POTASSIUM SERPL-SCNC: 4.1 MMOL/L (ref 3.4–5)
RBC # BLD AUTO: 3.81 M/MM3 (ref 4.1–5.3)
RETICS #: 0.08 M/MM3 (ref 0.02–0.16)
RETICS/RBC NFR AUTO: 2.1 % (ref 0.5–3.52)
SODIUM SERPL-SCNC: 137 MMOL/L (ref 137–145)
STOMATOCYTES BLD QL SMEAR: (no result)
TIBC SERPL-MCNC: 327 UG/DL (ref 265–497)

## 2018-07-18 ENCOUNTER — HOSPITAL ENCOUNTER (OUTPATIENT)
Dept: HOSPITAL 6 - PT | Age: 53
Discharge: HOME | End: 2018-07-18
Payer: MEDICAID

## 2018-07-18 DIAGNOSIS — M54.5: Primary | ICD-10-CM

## 2018-07-29 ENCOUNTER — HOSPITAL ENCOUNTER (EMERGENCY)
Dept: HOSPITAL 19 - COL.ER | Age: 53
Discharge: HOME | End: 2018-07-29
Payer: MEDICAID

## 2018-07-29 VITALS — WEIGHT: 175.27 LBS | BODY MASS INDEX: 29.2 KG/M2 | HEIGHT: 65 IN

## 2018-07-29 VITALS — SYSTOLIC BLOOD PRESSURE: 119 MMHG | DIASTOLIC BLOOD PRESSURE: 69 MMHG

## 2018-07-29 VITALS — HEART RATE: 80 BPM

## 2018-07-29 VITALS — TEMPERATURE: 99 F

## 2018-07-29 DIAGNOSIS — F32.9: ICD-10-CM

## 2018-07-29 DIAGNOSIS — F43.10: ICD-10-CM

## 2018-07-29 DIAGNOSIS — Z90.89: ICD-10-CM

## 2018-07-29 DIAGNOSIS — F41.9: ICD-10-CM

## 2018-07-29 DIAGNOSIS — Z98.51: ICD-10-CM

## 2018-07-29 DIAGNOSIS — M79.7: ICD-10-CM

## 2018-07-29 DIAGNOSIS — Z90.49: ICD-10-CM

## 2018-07-29 DIAGNOSIS — Z95.5: ICD-10-CM

## 2018-07-29 DIAGNOSIS — K43.2: Primary | ICD-10-CM

## 2018-07-29 DIAGNOSIS — R07.89: ICD-10-CM

## 2018-07-29 DIAGNOSIS — J44.9: ICD-10-CM

## 2018-07-29 LAB
ALBUMIN SERPL-MCNC: 3.9 GM/DL (ref 3.5–5)
ALP SERPL-CCNC: 129 U/L (ref 50–136)
ALT SERPL-CCNC: 38 U/L (ref 9–52)
ANION GAP SERPL CALC-SCNC: 10 MMOL/L (ref 7–16)
AST SERPL-CCNC: 21 U/L (ref 15–37)
BASOPHILS # BLD: 0.1 10*3/UL (ref 0–0.2)
BASOPHILS NFR BLD AUTO: 0.8 % (ref 0–2)
BILIRUB SERPL-MCNC: 0.2 MG/DL (ref 0–1)
BUN SERPL-MCNC: 11 MG/DL (ref 7–17)
CALCIUM SERPL-MCNC: 9.1 MG/DL (ref 8.4–10.2)
CHLORIDE SERPL-SCNC: 97 MMOL/L (ref 98–107)
CK SERPL-CCNC: 24 U/L (ref 30–135)
CO2 SERPL-SCNC: 29 MMOL/L (ref 22–30)
CREAT SERPL-SCNC: 0.7 MG/DL (ref 0.52–1.25)
EOSINOPHIL # BLD: 0.3 10*3/UL (ref 0–0.7)
EOSINOPHIL NFR BLD: 2.4 % (ref 0–4)
ERYTHROCYTE [DISTWIDTH] IN BLOOD BY AUTOMATED COUNT: 13.2 % (ref 11.5–14.5)
GLUCOSE SERPL-MCNC: 100 MG/DL (ref 74–106)
GRANULOCYTES # BLD AUTO: 66.8 % (ref 42.2–75.2)
HCT VFR BLD AUTO: 34.8 % (ref 37–47)
HGB BLD-MCNC: 11.1 G/DL (ref 12.5–16)
INR BLD: 0.9 (ref 0.8–3)
LIPASE SERPL-CCNC: 30 U/L (ref 23–300)
LYMPHOCYTES # BLD: 2.9 10*3/UL (ref 1.2–3.4)
LYMPHOCYTES NFR BLD: 22 % (ref 20–51)
MCH RBC QN AUTO: 30 PG (ref 27–31)
MCHC RBC AUTO-ENTMCNC: 32 G/DL (ref 33–37)
MCV RBC AUTO: 93 FL (ref 80–100)
MONOCYTES # BLD: 0.9 10*3/UL (ref 0.1–0.6)
MONOCYTES NFR BLD AUTO: 6.5 % (ref 1.7–9.3)
NEUTROPHILS # BLD: 8.9 10*3/UL (ref 1.4–6.5)
PLATELET # BLD AUTO: 326 K/MM3 (ref 130–400)
PMV BLD AUTO: 9.8 FL (ref 7.4–10.4)
POTASSIUM SERPL-SCNC: 3.8 MMOL/L (ref 3.4–5)
PROT SERPL-MCNC: 6.8 GM/DL (ref 6.4–8.2)
PROTHROMBIN TIME: 10.7 SECONDS (ref 9.7–12.8)
RBC # BLD AUTO: 3.73 M/MM3 (ref 4.1–5.3)
SODIUM SERPL-SCNC: 136 MMOL/L (ref 137–145)
TROPONIN I SERPL-MCNC: < 0.012 NG/ML (ref 0–0.03)

## 2018-10-02 ENCOUNTER — HOSPITAL ENCOUNTER (OUTPATIENT)
Dept: HOSPITAL 6 - PT | Age: 53
End: 2018-10-02
Payer: MEDICAID

## 2018-10-02 DIAGNOSIS — M17.12: ICD-10-CM

## 2018-10-02 DIAGNOSIS — Z01.818: Primary | ICD-10-CM

## 2018-10-04 ENCOUNTER — HOSPITAL ENCOUNTER (OUTPATIENT)
Dept: HOSPITAL 19 - SDCO | Age: 53
Discharge: HOME | End: 2018-10-04
Attending: ORTHOPAEDIC SURGERY
Payer: MEDICAID

## 2018-10-04 VITALS — SYSTOLIC BLOOD PRESSURE: 96 MMHG | TEMPERATURE: 97.2 F | DIASTOLIC BLOOD PRESSURE: 50 MMHG | HEART RATE: 88 BPM

## 2018-10-04 VITALS — BODY MASS INDEX: 28.61 KG/M2 | WEIGHT: 171.74 LBS | HEIGHT: 65 IN

## 2018-10-04 VITALS — SYSTOLIC BLOOD PRESSURE: 109 MMHG | DIASTOLIC BLOOD PRESSURE: 59 MMHG | HEART RATE: 83 BPM

## 2018-10-04 VITALS — HEART RATE: 77 BPM | SYSTOLIC BLOOD PRESSURE: 94 MMHG | DIASTOLIC BLOOD PRESSURE: 55 MMHG

## 2018-10-04 VITALS — SYSTOLIC BLOOD PRESSURE: 108 MMHG | DIASTOLIC BLOOD PRESSURE: 64 MMHG | HEART RATE: 89 BPM

## 2018-10-04 VITALS — DIASTOLIC BLOOD PRESSURE: 56 MMHG | SYSTOLIC BLOOD PRESSURE: 92 MMHG | HEART RATE: 86 BPM

## 2018-10-04 VITALS — SYSTOLIC BLOOD PRESSURE: 103 MMHG | HEART RATE: 91 BPM | DIASTOLIC BLOOD PRESSURE: 63 MMHG

## 2018-10-04 VITALS — SYSTOLIC BLOOD PRESSURE: 95 MMHG | TEMPERATURE: 98 F | DIASTOLIC BLOOD PRESSURE: 60 MMHG | HEART RATE: 80 BPM

## 2018-10-04 VITALS — SYSTOLIC BLOOD PRESSURE: 106 MMHG | HEART RATE: 88 BPM | DIASTOLIC BLOOD PRESSURE: 61 MMHG

## 2018-10-04 DIAGNOSIS — G47.30: ICD-10-CM

## 2018-10-04 DIAGNOSIS — M23.42: Primary | ICD-10-CM

## 2018-10-04 DIAGNOSIS — Z79.899: ICD-10-CM
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
## 2018-10-04 DIAGNOSIS — R56.9: ICD-10-CM

## 2018-10-04 DIAGNOSIS — I48.91: ICD-10-CM

## 2018-10-04 DIAGNOSIS — Z79.82: ICD-10-CM

## 2018-10-04 DIAGNOSIS — M17.12: ICD-10-CM

## 2018-10-10 ENCOUNTER — HOSPITAL ENCOUNTER (OUTPATIENT)
Dept: HOSPITAL 19 - WSST | Age: 53
LOS: 90 days | Discharge: HOME | End: 2019-01-08
Attending: OTOLARYNGOLOGY
Payer: MEDICAID

## 2018-10-10 DIAGNOSIS — R13.12: Primary | ICD-10-CM

## 2018-10-15 ENCOUNTER — HOSPITAL ENCOUNTER (OUTPATIENT)
Dept: HOSPITAL 6 - PT | Age: 53
Discharge: HOME | End: 2018-10-15
Payer: MEDICAID

## 2018-10-15 DIAGNOSIS — Z47.89: Primary | ICD-10-CM

## 2018-10-16 ENCOUNTER — HOSPITAL ENCOUNTER (OUTPATIENT)
Dept: HOSPITAL 19 - COL.RAD | Age: 53
End: 2018-10-16
Attending: NURSE PRACTITIONER
Payer: MEDICAID

## 2018-10-16 DIAGNOSIS — R13.10: ICD-10-CM

## 2018-10-16 DIAGNOSIS — G43.709: Primary | ICD-10-CM

## 2018-10-16 PROCEDURE — A9585 GADOBUTROL INJECTION: HCPCS

## 2019-05-07 ENCOUNTER — HOSPITAL ENCOUNTER (OUTPATIENT)
Dept: HOSPITAL 19 - MC.RAD | Age: 54
End: 2019-05-07
Payer: MEDICAID

## 2019-05-07 DIAGNOSIS — Z12.31: Primary | ICD-10-CM

## 2019-06-16 ENCOUNTER — HOSPITAL ENCOUNTER (EMERGENCY)
Dept: HOSPITAL 6 - ED | Age: 54
Discharge: HOME | End: 2019-06-16
Payer: SELF-PAY

## 2019-06-16 VITALS — BODY MASS INDEX: 30.56 KG/M2 | HEIGHT: 65 IN | WEIGHT: 183.42 LBS

## 2019-06-16 VITALS — SYSTOLIC BLOOD PRESSURE: 147 MMHG | DIASTOLIC BLOOD PRESSURE: 72 MMHG

## 2019-06-16 DIAGNOSIS — M65.841: Primary | ICD-10-CM

## 2019-06-16 DIAGNOSIS — Z90.49: ICD-10-CM

## 2019-07-23 ENCOUNTER — HOSPITAL ENCOUNTER (OUTPATIENT)
Dept: HOSPITAL 19 - MEDICAL | Age: 54
Setting detail: OBSERVATION
LOS: 1 days | Discharge: HOME | End: 2019-07-24
Attending: INTERNAL MEDICINE | Admitting: INTERNAL MEDICINE
Payer: MEDICAID

## 2019-07-23 VITALS — HEART RATE: 73 BPM | SYSTOLIC BLOOD PRESSURE: 110 MMHG | DIASTOLIC BLOOD PRESSURE: 52 MMHG | TEMPERATURE: 98.2 F

## 2019-07-23 VITALS — HEIGHT: 65.5 IN | BODY MASS INDEX: 31.9 KG/M2 | WEIGHT: 193.79 LBS

## 2019-07-23 VITALS — HEART RATE: 60 BPM | DIASTOLIC BLOOD PRESSURE: 57 MMHG | SYSTOLIC BLOOD PRESSURE: 139 MMHG | TEMPERATURE: 98 F

## 2019-07-23 DIAGNOSIS — Z82.3: ICD-10-CM

## 2019-07-23 DIAGNOSIS — I25.10: ICD-10-CM

## 2019-07-23 DIAGNOSIS — Z87.891: ICD-10-CM

## 2019-07-23 DIAGNOSIS — I08.2: ICD-10-CM

## 2019-07-23 DIAGNOSIS — Z79.82: ICD-10-CM

## 2019-07-23 DIAGNOSIS — Z86.73: ICD-10-CM

## 2019-07-23 DIAGNOSIS — Z79.899: ICD-10-CM

## 2019-07-23 DIAGNOSIS — F60.3: ICD-10-CM

## 2019-07-23 DIAGNOSIS — Z80.9: ICD-10-CM

## 2019-07-23 DIAGNOSIS — D64.9: ICD-10-CM

## 2019-07-23 DIAGNOSIS — R45.851: ICD-10-CM

## 2019-07-23 DIAGNOSIS — M79.7: ICD-10-CM

## 2019-07-23 DIAGNOSIS — I50.20: ICD-10-CM

## 2019-07-23 DIAGNOSIS — Z95.818: ICD-10-CM

## 2019-07-23 DIAGNOSIS — Z90.49: ICD-10-CM

## 2019-07-23 DIAGNOSIS — F41.1: ICD-10-CM

## 2019-07-23 DIAGNOSIS — G40.909: ICD-10-CM

## 2019-07-23 DIAGNOSIS — R07.9: Primary | ICD-10-CM

## 2019-07-23 DIAGNOSIS — G62.9: ICD-10-CM

## 2019-07-23 DIAGNOSIS — Z88.0: ICD-10-CM

## 2019-07-23 DIAGNOSIS — F43.10: ICD-10-CM

## 2019-07-23 DIAGNOSIS — Z82.49: ICD-10-CM

## 2019-07-23 PROCEDURE — A9500 TC99M SESTAMIBI: HCPCS

## 2019-07-23 PROCEDURE — G0378 HOSPITAL OBSERVATION PER HR: HCPCS

## 2019-07-23 PROCEDURE — G0379 DIRECT REFER HOSPITAL OBSERV: HCPCS

## 2019-07-23 NOTE — NUR
Reports 9/10 chest pain, right neck pain, and left facial. Provided with PRN
morphine at this time. Will monitor.

## 2019-07-23 NOTE — NUR
Arrived via EMS. Assessment complete. Lungs all fields crackles. Heart sounds
normal. Bowels active x4. Pulses strong throughout. No edema noted. Abrasion
to left forearm and right foot present. INT right AC without complications.
Reports pain in left chest, right neck and left face at this time. Answered
yes to most suidical questions, patient placed on precautions at this time.
Med rec complete. Orientated to medical floor. Call light in reach.

## 2019-07-24 VITALS — SYSTOLIC BLOOD PRESSURE: 121 MMHG | HEART RATE: 77 BPM | DIASTOLIC BLOOD PRESSURE: 75 MMHG | TEMPERATURE: 98.6 F

## 2019-07-24 VITALS — DIASTOLIC BLOOD PRESSURE: 65 MMHG | SYSTOLIC BLOOD PRESSURE: 110 MMHG | HEART RATE: 89 BPM

## 2019-07-24 VITALS — HEART RATE: 89 BPM | SYSTOLIC BLOOD PRESSURE: 110 MMHG | DIASTOLIC BLOOD PRESSURE: 65 MMHG

## 2019-07-24 VITALS — DIASTOLIC BLOOD PRESSURE: 70 MMHG | SYSTOLIC BLOOD PRESSURE: 127 MMHG | HEART RATE: 111 BPM

## 2019-07-24 VITALS — DIASTOLIC BLOOD PRESSURE: 65 MMHG | TEMPERATURE: 98.9 F | HEART RATE: 89 BPM | SYSTOLIC BLOOD PRESSURE: 110 MMHG

## 2019-07-24 VITALS — HEART RATE: 115 BPM | DIASTOLIC BLOOD PRESSURE: 66 MMHG | SYSTOLIC BLOOD PRESSURE: 130 MMHG

## 2019-07-24 VITALS — SYSTOLIC BLOOD PRESSURE: 130 MMHG | DIASTOLIC BLOOD PRESSURE: 67 MMHG | HEART RATE: 117 BPM

## 2019-07-24 VITALS — SYSTOLIC BLOOD PRESSURE: 113 MMHG | DIASTOLIC BLOOD PRESSURE: 60 MMHG | HEART RATE: 67 BPM

## 2019-07-24 VITALS — HEART RATE: 77 BPM | DIASTOLIC BLOOD PRESSURE: 75 MMHG | SYSTOLIC BLOOD PRESSURE: 121 MMHG

## 2019-07-24 VITALS — DIASTOLIC BLOOD PRESSURE: 65 MMHG | SYSTOLIC BLOOD PRESSURE: 131 MMHG | HEART RATE: 120 BPM

## 2019-07-24 LAB
ANION GAP SERPL CALC-SCNC: 8 MMOL/L (ref 7–16)
BUN SERPL-MCNC: 20 MG/DL (ref 7–17)
CALCIUM SERPL-MCNC: 9 MG/DL (ref 8.4–10.2)
CHLORIDE SERPL-SCNC: 105 MMOL/L (ref 98–107)
CHOLEST SPEC-SCNC: 226 MG/DL (ref 120–200)
CHOLEST/HDLC SERPL-SRTO: 8
CO2 SERPL-SCNC: 27 MMOL/L (ref 22–30)
CREAT SERPL-SCNC: 0.76 UMOL/L (ref 0.52–1.25)
EOSINOPHIL NFR BLD: 5 % (ref 0–4)
ERYTHROCYTE [DISTWIDTH] IN BLOOD BY AUTOMATED COUNT: 13.4 % (ref 11.5–14.5)
GLUCOSE SERPL-MCNC: 118 MG/DL (ref 74–106)
HCT VFR BLD AUTO: 34.1 % (ref 37–47)
HDLC SERPL-MCNC: 28 MG/DL
HGB BLD-MCNC: 10.5 G/DL (ref 12.5–16)
LDLC SERPL-MCNC: 135 MG/DL
LYMPHOCYTES NFR BLD MANUAL: 29 % (ref 20–51)
MAGNESIUM SERPL-MCNC: 2 MG/DL (ref 1.6–2.3)
MCH RBC QN AUTO: 28 PG (ref 27–31)
MCHC RBC AUTO-ENTMCNC: 31 G/DL (ref 33–37)
MCV RBC AUTO: 89 FL (ref 80–100)
MONOCYTES NFR BLD: 5 % (ref 1.7–9.3)
NEUTS BAND NFR BLD: 1 % (ref 0–10)
NEUTS SEG NFR BLD MANUAL: 60 % (ref 42–75.2)
NRBC BLD AUTO-RTO: 1 % (ref 0–6)
PLATELET # BLD AUTO: 274 K/MM3 (ref 130–400)
PLATELET BLD QL SMEAR: NORMAL
PMV BLD AUTO: 10.5 FL (ref 7.4–10.4)
POTASSIUM SERPL-SCNC: 4.3 MMOL/L (ref 3.4–5)
RBC # BLD AUTO: 3.82 M/MM3 (ref 4.1–5.3)
SODIUM SERPL-SCNC: 140 MMOL/L (ref 137–145)
TRIGL SERPL-MCNC: 316 MG/DL
TROPONIN I SERPL-MCNC: < 0.012 NG/ML (ref 0–0.04)

## 2019-07-24 NOTE — NUR
discharge orders reviewed, instructed to follow up with mental health in
[place] as previously scheduled,  has increased Cymbalta and
instructe dto take as prescribed, IV and tele removed, she is leaving with a
friend, CNA will escort her out the door

## 2019-07-24 NOTE — NUR
Patient had uneventful night. Remains in one to one suicide precautions
pending psych screening this AM. Resting in bed currently.

## 2019-07-24 NOTE — NUR
Assessment completed, patient sleeping, easily arousable, stated chest pain is
still 8-19/ 10 and nothing has helped releive the pain, heart RRR/ SR on tele,
distal pulses are palpable, lungs CTA/ no resp.difficulty, she has been NPO
and will have lexiscan around 0800, on visulaization and conversation with her
she does not appear to be in any acute distress or discomfort, patient
sleeping again by the time I left her room, 1:1 sitter in room for suicidal
ideations, will continue to monitor

## 2020-01-19 ENCOUNTER — HOSPITAL ENCOUNTER (EMERGENCY)
Dept: HOSPITAL 19 - COL.ER | Age: 55
Discharge: HOME | End: 2020-01-19
Payer: MEDICAID

## 2020-01-19 VITALS — HEIGHT: 65 IN | BODY MASS INDEX: 29.72 KG/M2 | WEIGHT: 178.35 LBS

## 2020-01-19 VITALS — HEART RATE: 79 BPM | DIASTOLIC BLOOD PRESSURE: 56 MMHG | SYSTOLIC BLOOD PRESSURE: 103 MMHG | TEMPERATURE: 98.1 F

## 2020-01-19 DIAGNOSIS — Z98.51: ICD-10-CM
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
## 2020-01-19 DIAGNOSIS — R10.9: ICD-10-CM

## 2020-01-19 DIAGNOSIS — Z90.89: ICD-10-CM

## 2020-01-19 DIAGNOSIS — Z87.442: ICD-10-CM

## 2020-01-19 DIAGNOSIS — Z79.82: ICD-10-CM

## 2020-01-19 DIAGNOSIS — S32.040A: Primary | ICD-10-CM

## 2020-01-19 LAB
ALBUMIN SERPL-MCNC: 4 GM/DL (ref 3.5–5)
ALP SERPL-CCNC: 149 U/L (ref 50–136)
ALT SERPL-CCNC: 19 U/L (ref 9–52)
ANION GAP SERPL CALC-SCNC: 9 MMOL/L (ref 7–16)
AST SERPL-CCNC: 15 U/L (ref 15–37)
BASOPHILS # BLD: 0.1 10*3/UL (ref 0–0.2)
BASOPHILS NFR BLD AUTO: 1 % (ref 0–2)
BILIRUB SERPL-MCNC: 0.3 MG/DL (ref 0–1)
BUN SERPL-MCNC: 18 MG/DL (ref 7–17)
CALCIUM SERPL-MCNC: 9.2 MG/DL (ref 8.4–10.2)
CHLORIDE SERPL-SCNC: 102 MMOL/L (ref 98–107)
CO2 SERPL-SCNC: 28 MMOL/L (ref 22–30)
CREAT SERPL-SCNC: 0.7 UMOL/L (ref 0.52–1.25)
EOSINOPHIL # BLD: 0.3 10*3/UL (ref 0–0.7)
EOSINOPHIL NFR BLD: 3.1 % (ref 0–4)
ERYTHROCYTE [DISTWIDTH] IN BLOOD BY AUTOMATED COUNT: 13.2 % (ref 11.5–14.5)
GLUCOSE SERPL-MCNC: 107 MG/DL (ref 74–106)
GRANULOCYTES # BLD AUTO: 52.2 % (ref 42.2–75.2)
HCT VFR BLD AUTO: 34.3 % (ref 37–47)
HGB BLD-MCNC: 10.8 G/DL (ref 12.5–16)
HYALINE CASTS #/AREA URNS LPF: (no result) /LPF
LIPASE SERPL-CCNC: 54 U/L (ref 23–300)
LYMPHOCYTES # BLD: 3.3 10*3/UL (ref 1.2–3.4)
LYMPHOCYTES NFR BLD: 33 % (ref 20–51)
MCH RBC QN AUTO: 27 PG (ref 27–31)
MCHC RBC AUTO-ENTMCNC: 32 G/DL (ref 33–37)
MCV RBC AUTO: 85 FL (ref 80–100)
MONOCYTES # BLD: 0.9 10*3/UL (ref 0.1–0.6)
MONOCYTES NFR BLD AUTO: 8.9 % (ref 1.7–9.3)
NEUTROPHILS # BLD: 5.3 10*3/UL (ref 1.4–6.5)
PH UR STRIP.AUTO: 5 [PH] (ref 5–8)
PLATELET # BLD AUTO: 279 K/MM3 (ref 130–400)
PMV BLD AUTO: 10.8 FL (ref 7.4–10.4)
POTASSIUM SERPL-SCNC: 4 MMOL/L (ref 3.4–5)
PROT SERPL-MCNC: 6.9 GM/DL (ref 6.4–8.2)
RBC # BLD AUTO: 4.02 M/MM3 (ref 4.1–5.3)
RBC # UR: (no result) /HPF
SODIUM SERPL-SCNC: 140 MMOL/L (ref 137–145)
SP GR UR STRIP.AUTO: 1 (ref 1–1.03)
SQUAMOUS # URNS: (no result) /HPF
URN COLLECT METHOD CLASS: (no result)

## 2020-06-15 ENCOUNTER — HOSPITAL ENCOUNTER (OUTPATIENT)
Dept: HOSPITAL 19 - COL.CAR | Age: 55
Discharge: HOME | End: 2020-06-15
Attending: INTERNAL MEDICINE
Payer: MEDICAID

## 2020-06-15 VITALS — DIASTOLIC BLOOD PRESSURE: 80 MMHG | HEART RATE: 85 BPM | TEMPERATURE: 98.2 F | SYSTOLIC BLOOD PRESSURE: 128 MMHG

## 2020-06-15 VITALS — TEMPERATURE: 98.2 F | HEART RATE: 82 BPM | SYSTOLIC BLOOD PRESSURE: 125 MMHG | DIASTOLIC BLOOD PRESSURE: 74 MMHG

## 2020-06-15 VITALS — SYSTOLIC BLOOD PRESSURE: 123 MMHG | HEART RATE: 80 BPM | DIASTOLIC BLOOD PRESSURE: 78 MMHG | TEMPERATURE: 98.2 F

## 2020-06-15 VITALS — HEART RATE: 85 BPM | DIASTOLIC BLOOD PRESSURE: 80 MMHG | SYSTOLIC BLOOD PRESSURE: 128 MMHG

## 2020-06-15 VITALS — HEIGHT: 65 IN | WEIGHT: 190.48 LBS | BODY MASS INDEX: 31.74 KG/M2

## 2020-06-15 VITALS — SYSTOLIC BLOOD PRESSURE: 120 MMHG | TEMPERATURE: 98.2 F | HEART RATE: 84 BPM | DIASTOLIC BLOOD PRESSURE: 80 MMHG

## 2020-06-15 VITALS — DIASTOLIC BLOOD PRESSURE: 68 MMHG | TEMPERATURE: 98.2 F | SYSTOLIC BLOOD PRESSURE: 121 MMHG | HEART RATE: 78 BPM

## 2020-06-15 VITALS — TEMPERATURE: 98.2 F | DIASTOLIC BLOOD PRESSURE: 71 MMHG | SYSTOLIC BLOOD PRESSURE: 136 MMHG | HEART RATE: 89 BPM

## 2020-06-15 DIAGNOSIS — J44.9: ICD-10-CM

## 2020-06-15 DIAGNOSIS — I35.1: ICD-10-CM

## 2020-06-15 DIAGNOSIS — G47.33: ICD-10-CM

## 2020-06-15 DIAGNOSIS — F43.10: ICD-10-CM

## 2020-06-15 DIAGNOSIS — I42.8: ICD-10-CM

## 2020-06-15 DIAGNOSIS — Z88.0: ICD-10-CM

## 2020-06-15 DIAGNOSIS — Z95.818: Primary | ICD-10-CM

## 2020-06-15 DIAGNOSIS — Z88.1: ICD-10-CM

## 2021-06-27 ENCOUNTER — HOSPITAL ENCOUNTER (EMERGENCY)
Dept: HOSPITAL 19 - COL.ER | Age: 56
Discharge: HOME | End: 2021-06-27
Payer: COMMERCIAL

## 2021-06-27 VITALS — WEIGHT: 188.5 LBS | HEIGHT: 65 IN | BODY MASS INDEX: 31.4 KG/M2

## 2021-06-27 VITALS — SYSTOLIC BLOOD PRESSURE: 133 MMHG | DIASTOLIC BLOOD PRESSURE: 74 MMHG | TEMPERATURE: 98.7 F | HEART RATE: 88 BPM

## 2021-06-27 DIAGNOSIS — G40.909: ICD-10-CM

## 2021-06-27 DIAGNOSIS — I11.0: ICD-10-CM

## 2021-06-27 DIAGNOSIS — I25.10: ICD-10-CM

## 2021-06-27 DIAGNOSIS — Z86.73: ICD-10-CM

## 2021-06-27 DIAGNOSIS — Z79.899: ICD-10-CM

## 2021-06-27 DIAGNOSIS — M54.5: Primary | ICD-10-CM

## 2021-06-27 DIAGNOSIS — F43.10: ICD-10-CM

## 2021-06-27 DIAGNOSIS — Z79.82: ICD-10-CM
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
## 2021-06-27 DIAGNOSIS — Z98.890: ICD-10-CM

## 2021-06-27 DIAGNOSIS — F17.200: ICD-10-CM
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
## 2021-06-27 DIAGNOSIS — J44.9: ICD-10-CM

## 2021-06-27 DIAGNOSIS — F41.9: ICD-10-CM

## 2021-06-27 DIAGNOSIS — Z87.81: ICD-10-CM
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
## 2021-06-27 DIAGNOSIS — I50.9: ICD-10-CM

## 2021-07-13 ENCOUNTER — HOSPITAL ENCOUNTER (OUTPATIENT)
Dept: HOSPITAL 19 - WSOH | Age: 56
LOS: 83 days | Discharge: HOME | End: 2021-10-04
Attending: PHYSICIAN ASSISTANT
Payer: COMMERCIAL

## 2021-07-13 DIAGNOSIS — K59.00: ICD-10-CM

## 2021-07-13 DIAGNOSIS — Z98.51: ICD-10-CM

## 2021-07-13 DIAGNOSIS — M51.36: Primary | ICD-10-CM

## 2021-07-13 DIAGNOSIS — M79.7: ICD-10-CM

## 2021-07-13 DIAGNOSIS — G62.9: ICD-10-CM

## 2021-07-13 DIAGNOSIS — F41.8: ICD-10-CM

## 2021-07-13 DIAGNOSIS — Z87.81: ICD-10-CM

## 2021-07-13 DIAGNOSIS — G25.81: ICD-10-CM
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
## 2021-07-13 DIAGNOSIS — Z90.89: ICD-10-CM

## 2021-07-13 DIAGNOSIS — F41.9: ICD-10-CM

## 2021-07-13 DIAGNOSIS — I35.1: ICD-10-CM

## 2021-07-13 DIAGNOSIS — J45.909: ICD-10-CM

## 2021-07-13 DIAGNOSIS — Z98.890: ICD-10-CM

## 2021-07-13 DIAGNOSIS — K21.9: ICD-10-CM

## 2021-07-13 DIAGNOSIS — R32: ICD-10-CM
